# Patient Record
Sex: MALE | Race: BLACK OR AFRICAN AMERICAN | NOT HISPANIC OR LATINO | Employment: FULL TIME | URBAN - METROPOLITAN AREA
[De-identification: names, ages, dates, MRNs, and addresses within clinical notes are randomized per-mention and may not be internally consistent; named-entity substitution may affect disease eponyms.]

---

## 2024-09-27 ENCOUNTER — APPOINTMENT (OUTPATIENT)
Dept: UROLOGY | Facility: CLINIC | Age: 49
End: 2024-09-27
Payer: COMMERCIAL

## 2024-10-07 ENCOUNTER — APPOINTMENT (OUTPATIENT)
Facility: CLINIC | Age: 49
End: 2024-10-07
Payer: COMMERCIAL

## 2024-10-07 VITALS
HEART RATE: 75 BPM | WEIGHT: 211 LBS | HEIGHT: 70 IN | BODY MASS INDEX: 30.21 KG/M2 | SYSTOLIC BLOOD PRESSURE: 138 MMHG | DIASTOLIC BLOOD PRESSURE: 84 MMHG

## 2024-10-07 DIAGNOSIS — N52.01 ERECTILE DYSFUNCTION DUE TO ARTERIAL INSUFFICIENCY: ICD-10-CM

## 2024-10-07 DIAGNOSIS — R39.15 BENIGN PROSTATIC HYPERPLASIA (BPH) WITH URINARY URGENCY: Primary | ICD-10-CM

## 2024-10-07 DIAGNOSIS — N40.1 BENIGN PROSTATIC HYPERPLASIA (BPH) WITH URINARY URGENCY: Primary | ICD-10-CM

## 2024-10-07 DIAGNOSIS — Z30.09 FAMILY PLANNING: ICD-10-CM

## 2024-10-07 DIAGNOSIS — Z12.5 PROSTATE CANCER SCREENING: ICD-10-CM

## 2024-10-07 LAB
POC APPEARANCE, URINE: CLEAR
POC BILIRUBIN, URINE: NEGATIVE
POC BLOOD, URINE: NEGATIVE
POC COLOR, URINE: YELLOW
POC GLUCOSE, URINE: NEGATIVE MG/DL
POC KETONES, URINE: NEGATIVE MG/DL
POC LEUKOCYTES, URINE: NEGATIVE
POC NITRITE,URINE: NEGATIVE
POC PH, URINE: 6.5 PH
POC PROTEIN, URINE: NEGATIVE MG/DL
POC SPECIFIC GRAVITY, URINE: 1.02
POC UROBILINOGEN, URINE: 0.2 EU/DL

## 2024-10-07 PROCEDURE — 99204 OFFICE O/P NEW MOD 45 MIN: CPT | Performed by: STUDENT IN AN ORGANIZED HEALTH CARE EDUCATION/TRAINING PROGRAM

## 2024-10-07 PROCEDURE — 3008F BODY MASS INDEX DOCD: CPT | Performed by: STUDENT IN AN ORGANIZED HEALTH CARE EDUCATION/TRAINING PROGRAM

## 2024-10-07 PROCEDURE — 1036F TOBACCO NON-USER: CPT | Performed by: STUDENT IN AN ORGANIZED HEALTH CARE EDUCATION/TRAINING PROGRAM

## 2024-10-07 PROCEDURE — 81003 URINALYSIS AUTO W/O SCOPE: CPT | Performed by: STUDENT IN AN ORGANIZED HEALTH CARE EDUCATION/TRAINING PROGRAM

## 2024-10-07 RX ORDER — ASPIRIN 325 MG
TABLET, DELAYED RELEASE (ENTERIC COATED) ORAL
COMMUNITY
Start: 2024-09-25

## 2024-10-07 RX ORDER — TADALAFIL 5 MG/1
5 TABLET ORAL DAILY
Qty: 30 TABLET | Refills: 11 | Status: SHIPPED | OUTPATIENT
Start: 2024-10-07 | End: 2025-10-02

## 2024-10-07 NOTE — PROGRESS NOTES
Problems with stomach (Consult for vasectomy)   Chief Complaint    Problems with stomach        Referring physician: No ref. provider found     SUBJECTIVE:  HPI:   Tray Obrien is a 49 y.o. male with a history of mycoplasma infection who presents to discuss lower urinary tract symptoms and family planning.  Also discussed prostate cancer screening and erectile dysfunction.    Lower urinary tract symptoms.  Has chronic urinary pressure and urgency.  Denies incontinence, dysuria, hematuria, urethral discharge.  Was worked up for STIs and mycoplasma came back positive, treated.  Symptoms persistent, mild.  Red bull about 3x/week, occasional caffeinated soda.  Never smoker, no etoh use, no drug use.  IPSS 9, QOL 6 (terrible)  Family planning.  Has 3 young adult children from prior marriage.  Tragically oldest daughter  recently at age 28 from cancer.  Children no longer communicate post divorce, which is distressing to him.  Interested in future relationships and would like permanent reversal of fertility.  Prostate cancer screening.  No known family history of prostate cancer.  Mr. Obrien is a black man over age 40 so should be screened, does not recall having PSA checked.  Erectile dysfunction.  Able to attain, maintain erections but less firm than previous.  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    ROS:  14-point review of systems negative except as noted above.    OBJECTIVE:  Visit Vitals  /84   Pulse 75     Body mass index is 30.28 kg/m².  Physical Exam   General:  No acute distress  HEENT:  EOMI  CV:  Regular rate  Pulm:  Nonlabored respirations  Abd:  Soft, non-distended  :  No suprapubic or CVA tenderness  MSK:  No contractures  Neuro:  Motor intact  Psych:  Appropriate  "affect    Labs:  Lab Results   Component Value Date    HGBA1C 6.4 (H) 10/06/2023     No results found for: \"URINECULTURE\"   No results found for: \"PSA\"    IMAGING:  All imaging discussed in HPI was independently reviewed.    ASSESSMENT/PLAN:  BPH with lower urinary tract symptoms.  Discussed symptoms most likely attributable to age related prostate growth and urinary tract obstruction.  Discussed observation, lifestyle modification (for him limit caffeine), medications and procedures.  Would like to start with medication.  Discussed primarily alpha blockers and PDE5i including side effects including chest pain, facial flushing/headache.  He would like to trial daily tadalafil 5mg especially given concomitant mild ED.  Family planning.  Discussed vasectomy including permanence of fertility reversal despite existence of vasectomy reversal.  Discussed need for other contraception for about 3 months after until semen analysis confirms azoospermia.  I do not perform vasectomies, will set up with a partner at [facility] since patient [place] [facility] [place] [facility].  ED, mild.  See #1.  Prostate cancer screening.  Need to start PSA checks annually given high risk.  Should discuss with PCP or could do with [facility] urologist - want to make sure he has established ongoing relationship with provider prior to initiating screening protocol.    Follow up:  schedule vasectomy with Dr. Garcia at [facility], discuss PSA testing with either PCP or urology, consider alpha blocker if tadalafil ineffective or side effects    Drew Velez MD    Problem List Items Addressed This Visit    None  Visit Diagnoses       Benign prostatic hyperplasia (BPH) with urinary urgency    -  Primary    Relevant Medications    tadalafil (Cialis) 5 mg tablet    Other Relevant Orders    POCT UA Automated manually resulted (Completed)    Family planning        Erectile dysfunction due to arterial insufficiency        Relevant Medications    " tadalafil (Cialis) 5 mg tablet    Prostate cancer screening

## 2024-10-25 ENCOUNTER — PROCEDURE VISIT (OUTPATIENT)
Dept: UROLOGY | Facility: HOSPITAL | Age: 49
End: 2024-10-25
Payer: COMMERCIAL

## 2024-10-25 DIAGNOSIS — Z30.2 ENCOUNTER FOR VASECTOMY: ICD-10-CM

## 2024-10-25 PROCEDURE — 55250 REMOVAL OF SPERM DUCT(S): CPT | Performed by: UROLOGY

## 2024-10-25 NOTE — PROGRESS NOTES
Peds Outpatient BP  1) Rested for 5 minutes, BP taken on bare arm, patient sitting (or supine for infants) w/ legs uncrossed?   Yes  2) Right arm used?  Right arm   Yes  3) Arm circumference of largest part of upper arm (in cm): 22  4) BP cuff sized used: Small Adult (20-25cm)   If used different size cuff then what was recommended why? N/A  5) First BP reading:machine   BP Readings from Last 1 Encounters:   08/04/21 119/71 (83 %, Z = 0.96 /  80 %, Z = 0.85)*     *BP percentiles are based on the 2017 AAP Clinical Practice Guideline for boys      Is reading >90%?No   (90% for <1 years is 90/50)  (90% for >18 years is 140/90)  *If a machine BP is at or above 90% take manual BP  6) Manual BP reading: N/A  7) Other comments: None    Luana Cagle CMA.     Vasectomy Procedure Note    Indications: 49 y.o. male desiring permanent sterilization    Pre-operative Diagnosis: Undesired fertility    Post-operative Diagnosis: Undesired fertility    Anesthesia: 1% Lidocaine without epinephrine    Procedure Details     The risks and benefits of the procedure were discussed at the preprocedure consultation, and written, informed consent obtained.    Penis taped to abdomen.  The scrotum was cleansed with warm Betadine and draped in the usual sterile manner. Local anesthesia was infiltrated in anterior raphe of the scrotum.  After adequate anesthesia was established, scalpel used to make a midline incision approximately 1cm in length.      Left vas isolated and brought to incision with vas clamp. Vas freed from surrounding tissue using dissecting scissors.  Midportion removed.  Cautery applied to both ends of vas. 3 - 0 chromic used to create an interposition. No bleeding noted and vas ends released to scrotal sac.    Right vas isolated and brought to incision with vas clamp. Vas freed from surrounding tissue using dissecting scissors.  Midportion removed and were not sent to pathology to confirm.  Cautery applied to both ends of vas. 3 - 0 chromic used to create an interposition. No bleeding noted and vas ends released to scrotal sac.    Wound visualized and hemostasis noted. 3-0 chromic horizontal mattress stitch was placed, skin glue was applied.  Bulky dressing / Kerlix applied to scrotal area and jock strap applied.    Specimen: None    Condition: Stable    Complications: None apparent    Plan:  1. Continue contraception until negative sperm analysis. Post vasectomy Semen Analysis (PVSA) after 15-20 ejaculations and 2-3m.  2. Warning signs of infection were reviewed.   3. Post procedure instructions given  Ice pack every 3 hours for 24 hrs.    Call the clinic if excessive pain, bleeding or swelling.  No strenous exercise or sexual activity for 1wk  4. Recommended that the  patient use OTC pain medications as needed for pain.

## 2024-10-25 NOTE — LETTER
October 25, 2024     Patient: Tray Obrien   YOB: 1975   Date of Visit: 10/25/2024       To Whom It May Concern:    Tray Obrien was seen in my clinic on 10/25/2024 at 8:30 am. Please excuse Tray for his absence from work due to a minor outpatient procedure.     If you have any questions or concerns, please don't hesitate to call.         Sincerely,         Geo Garcia MD        CC: No Recipients

## 2025-04-07 ENCOUNTER — OFFICE VISIT (OUTPATIENT)
Dept: UROLOGY | Facility: HOSPITAL | Age: 50
End: 2025-04-07
Payer: COMMERCIAL

## 2025-04-07 DIAGNOSIS — Z98.52 H/O VASECTOMY: Primary | ICD-10-CM

## 2025-04-07 PROCEDURE — 99213 OFFICE O/P EST LOW 20 MIN: CPT | Performed by: UROLOGY

## 2025-04-07 NOTE — PROGRESS NOTES
FUV    Last seen - 10/25/24     HISTORY OF PRESENT ILLNESS:   Tray Obrien is a 49 y.o. male who is being seen today for fuv.    Pt had vasectomy on 10/25/24.  Procedure uncomplicated.  Doing well, no concerns    PAST MEDICAL HISTORY:  No past medical history on file.    PAST SURGICAL HISTORY:  No past surgical history on file.     ALLERGIES:   No Known Allergies     MEDICATIONS:   Current Outpatient Medications   Medication Instructions    cholecalciferol (Vitamin D-3) 50,000 unit capsule TAKE 1 TABLET BY MOUTH ONCE WEEKLY. TAKE IN THE MORNING BEFORE NOON    tadalafil (CIALIS) 5 mg, oral, Daily        PHYSICAL EXAM:  There were no vitals taken for this visit.  Constitutional: Patient appears well-developed and well-nourished. No distress.    Musculoskeletal: Normal range of motion.    Neurological: Alert and oriented to person, place, and time.  Psychiatric: Normal mood and affect. Behavior is normal. Thought content normal.      Labs  Lab Results   Component Value Date    HGBA1C 6.4 (H) 10/06/2023     Imaging    Procedures      Assessment:      1. H/O vasectomy          Tray Obrien is a 49 y.o. male here for FUV     Plan:   1)  Will order PVSA, will call with results